# Patient Record
Sex: FEMALE
[De-identification: names, ages, dates, MRNs, and addresses within clinical notes are randomized per-mention and may not be internally consistent; named-entity substitution may affect disease eponyms.]

---

## 2018-01-22 ENCOUNTER — HOSPITAL (OUTPATIENT)
Dept: OTHER | Age: 63
End: 2018-01-22
Attending: INTERNAL MEDICINE

## 2024-09-24 ENCOUNTER — OFFICE VISIT (OUTPATIENT)
Dept: FAMILY MEDICINE CLINIC | Facility: CLINIC | Age: 69
End: 2024-09-24
Payer: MEDICARE

## 2024-09-24 VITALS
TEMPERATURE: 98 F | OXYGEN SATURATION: 99 % | WEIGHT: 127 LBS | HEIGHT: 58.5 IN | BODY MASS INDEX: 25.95 KG/M2 | DIASTOLIC BLOOD PRESSURE: 76 MMHG | SYSTOLIC BLOOD PRESSURE: 142 MMHG | RESPIRATION RATE: 18 BRPM | HEART RATE: 89 BPM

## 2024-09-24 DIAGNOSIS — U07.1 POSITIVE SELF-ADMINISTERED ANTIGEN TEST FOR COVID-19: Primary | ICD-10-CM

## 2024-09-24 PROCEDURE — 99203 OFFICE O/P NEW LOW 30 MIN: CPT | Performed by: NURSE PRACTITIONER

## 2024-09-24 RX ORDER — FAMOTIDINE 40 MG/1
TABLET, FILM COATED ORAL
COMMUNITY
Start: 2024-04-05

## 2024-09-24 RX ORDER — LOSARTAN POTASSIUM 25 MG/1
TABLET ORAL
COMMUNITY
Start: 2024-04-05

## 2024-09-24 RX ORDER — BETAMETHASONE VALERATE 1.2 MG/G
CREAM TOPICAL
COMMUNITY
Start: 2022-10-27

## 2024-09-24 RX ORDER — LIOTHYRONINE SODIUM 5 UG/1
TABLET ORAL
COMMUNITY
Start: 2024-04-05

## 2024-09-24 RX ORDER — ATORVASTATIN CALCIUM 10 MG/1
10 TABLET, FILM COATED ORAL
COMMUNITY
Start: 2024-04-05

## 2024-09-24 RX ORDER — LEVOTHYROXINE SODIUM 75 UG/1
75 TABLET ORAL
COMMUNITY
Start: 2024-04-05

## 2024-09-24 NOTE — PROGRESS NOTES
CHIEF COMPLAINT:     Chief Complaint   Patient presents with    Covid       HPI:   Elsy Veloz is a 69 year old female presents to clinic with complaint of URI symptoms.  Tested positive for COVID on home test, requesting Paxlovid.  Onset 2 days.  C/o body aches then sore throat, HA, slight cough, slight runny nose.  Felt warm at onset, no known fever.  Denies dyspnea, chest pain, SOB, GI complaints, ear pain, or rashes.  Tolerating PO.  Denies asthma/hx of pneumonia.  Sees PCP regularly.  No prior Hx of COVID.  UTD on covid vaccines, just got last booster 2-3 weeks ago.  Lives in [place], visiting family here.    Current Outpatient Medications   Medication Sig Dispense Refill    losartan 25 MG Oral Tab Take by mouth.      liothyronine 5 MCG Oral Tab Take by mouth.      levothyroxine 75 MCG Oral Tab Take 1 tablet (75 mcg total) by mouth.      betamethasone 0.1 % External Cream Apply topically.      famotidine 40 MG Oral Tab Take by mouth.      atorvastatin 10 MG Oral Tab Take 1 tablet (10 mg total) by mouth.      Aspirin 81 MG Oral Cap Take by mouth.      Calcium Carbonate (CALTRATE 600 OR) Take by mouth.      nirmatrelvir-ritonavir 300-100 MG Oral Tablet Therapy Pack Take two nirmatrelvir tablets (300mg) with one ritonavir tablet (100mg) together twice daily for 5 days. 30 tablet 0      No past medical history on file.   Social History:  Social History     Socioeconomic History    Marital status:         REVIEW OF SYSTEMS:   GENERAL HEALTH: see HPI  SKIN: denies any unusual skin lesions or rashes  HEENT: denies ear pain or difficulty swallowing/eating. See HPI  RESPIRATORY: denies shortness of breath or wheezing  CARDIOVASCULAR: denies chest pain or palpitations   GI: denies vomiting or diarrhea  NEURO: denies dizziness or lightheadedness    EXAM:   /76   Pulse 89   Temp 97.9 °F (36.6 °C)   Resp 18   Ht 4' 10.5\" (1.486 m)   Wt 127 lb (57.6 kg)   SpO2 99%   BMI 26.09 kg/m²   GENERAL:  Well-appearing, well developed, well nourished, in no apparent distress  SKIN: no rashes, no suspicious lesions  HEAD: atraumatic, normocephalic  EYES: conjunctiva clear, EOM intact  EARS: TM's clear, non-injected, no bulging, retraction, or fluid bilaterally  NOSE: nostrils patent, no exudates, nasal mucosa pink and noninflamed  THROAT: oral mucosa pink, moist. Posterior pharynx +minimally erythematous. No exudates.   NECK: supple, non-tender  LUNGS: clear to auscultation bilaterally, no wheezes or rhonchi. Breathing is non labored. No cough during visit.  CARDIO: RRR without murmur  LYMPH: No lymphadenopathy.      Reviewed:  eGFR [>90] 96 1  (7/19/24 9:27 AM)       ASSESSMENT AND PLAN:   Assessment:   Elsy was seen today for covid.    Diagnoses and all orders for this visit:    Positive self-administered antigen test for COVID-19  -     nirmatrelvir-ritonavir 300-100 MG Oral Tablet Therapy Pack; Take two nirmatrelvir tablets (300mg) with one ritonavir tablet (100mg) together twice daily for 5 days.          Plan:   - Pt brought in (+) home COVID test.  - Reviewed last renal function from labs done at home in [place] and [facility].  - After discussion of risk vs benefit and potential side effects, pt opts to proceed with Paxlovid.    - Comfort measures explained and discussed as listed in Patient Instructions.  - Advised follow up within 5 days if not improving, condition worsens, or new/persistent fevers.  - Advised to proceed to ER if ever dyspnea, SOB, chest pain, dehydration.  - Pt verbalizes understanding and is agreeable w/ plan.    There are no Patient Instructions on file for this visit.